# Patient Record
Sex: FEMALE | Race: WHITE | NOT HISPANIC OR LATINO
[De-identification: names, ages, dates, MRNs, and addresses within clinical notes are randomized per-mention and may not be internally consistent; named-entity substitution may affect disease eponyms.]

---

## 2018-04-24 PROBLEM — Z00.00 ENCOUNTER FOR PREVENTIVE HEALTH EXAMINATION: Status: ACTIVE | Noted: 2018-04-24

## 2018-05-15 ENCOUNTER — APPOINTMENT (OUTPATIENT)
Dept: NUCLEAR MEDICINE | Facility: CLINIC | Age: 58
End: 2018-05-15
Payer: MEDICARE

## 2018-05-15 ENCOUNTER — OUTPATIENT (OUTPATIENT)
Dept: OUTPATIENT SERVICES | Facility: HOSPITAL | Age: 58
LOS: 1 days | End: 2018-05-15

## 2018-05-15 DIAGNOSIS — Z85.118 PERSONAL HISTORY OF OTHER MALIGNANT NEOPLASM OF BRONCHUS AND LUNG: ICD-10-CM

## 2018-05-16 PROCEDURE — 78815 PET IMAGE W/CT SKULL-THIGH: CPT | Mod: 26,PS

## 2019-02-22 ENCOUNTER — OUTPATIENT (OUTPATIENT)
Dept: OUTPATIENT SERVICES | Facility: HOSPITAL | Age: 59
LOS: 1 days | End: 2019-02-22

## 2019-11-29 ENCOUNTER — APPOINTMENT (OUTPATIENT)
Dept: CT IMAGING | Facility: CLINIC | Age: 59
End: 2019-11-29
Payer: MEDICARE

## 2019-11-29 PROCEDURE — 70450 CT HEAD/BRAIN W/O DYE: CPT

## 2020-01-07 ENCOUNTER — APPOINTMENT (OUTPATIENT)
Dept: RADIOLOGY | Facility: CLINIC | Age: 60
End: 2020-01-07
Payer: MEDICARE

## 2020-01-07 PROCEDURE — 77080 DXA BONE DENSITY AXIAL: CPT

## 2020-02-18 ENCOUNTER — RESULT REVIEW (OUTPATIENT)
Age: 60
End: 2020-02-18

## 2020-06-01 ENCOUNTER — APPOINTMENT (OUTPATIENT)
Dept: MAMMOGRAPHY | Facility: CLINIC | Age: 60
End: 2020-06-01

## 2021-04-29 ENCOUNTER — APPOINTMENT (OUTPATIENT)
Dept: RADIOLOGY | Facility: CLINIC | Age: 61
End: 2021-04-29
Payer: MEDICARE

## 2021-04-29 PROCEDURE — 72050 X-RAY EXAM NECK SPINE 4/5VWS: CPT

## 2021-04-29 PROCEDURE — 71046 X-RAY EXAM CHEST 2 VIEWS: CPT

## 2021-09-02 DIAGNOSIS — Z01.818 ENCOUNTER FOR OTHER PREPROCEDURAL EXAMINATION: ICD-10-CM

## 2021-09-04 ENCOUNTER — APPOINTMENT (OUTPATIENT)
Dept: DISASTER EMERGENCY | Facility: CLINIC | Age: 61
End: 2021-09-04

## 2021-09-05 LAB — SARS-COV-2 N GENE NPH QL NAA+PROBE: NOT DETECTED

## 2021-09-07 ENCOUNTER — OUTPATIENT (OUTPATIENT)
Dept: OUTPATIENT SERVICES | Facility: HOSPITAL | Age: 61
LOS: 1 days | End: 2021-09-07

## 2021-09-07 ENCOUNTER — INPATIENT (INPATIENT)
Facility: HOSPITAL | Age: 61
LOS: 3 days | Discharge: HOME | End: 2021-09-11
Admitting: RADIOLOGY
Payer: MEDICARE

## 2021-09-07 PROCEDURE — 71045 X-RAY EXAM CHEST 1 VIEW: CPT | Mod: 26

## 2021-09-07 PROCEDURE — 93010 ELECTROCARDIOGRAM REPORT: CPT

## 2021-09-07 PROCEDURE — 32400 NEEDLE BIOPSY CHEST LINING: CPT | Mod: LT

## 2021-09-07 PROCEDURE — 77012 CT SCAN FOR NEEDLE BIOPSY: CPT | Mod: 26,LT

## 2021-09-07 PROCEDURE — 75989 ABSCESS DRAINAGE UNDER X-RAY: CPT | Mod: 26

## 2021-09-08 ENCOUNTER — OUTPATIENT (OUTPATIENT)
Dept: OUTPATIENT SERVICES | Facility: HOSPITAL | Age: 61
LOS: 1 days | End: 2021-09-08

## 2021-09-08 PROCEDURE — 71045 X-RAY EXAM CHEST 1 VIEW: CPT | Mod: 26

## 2021-09-09 ENCOUNTER — OUTPATIENT (OUTPATIENT)
Dept: OUTPATIENT SERVICES | Facility: HOSPITAL | Age: 61
LOS: 1 days | End: 2021-09-09

## 2021-09-09 PROCEDURE — 71045 X-RAY EXAM CHEST 1 VIEW: CPT | Mod: 26

## 2021-09-10 ENCOUNTER — OUTPATIENT (OUTPATIENT)
Dept: OUTPATIENT SERVICES | Facility: HOSPITAL | Age: 61
LOS: 1 days | End: 2021-09-10

## 2021-09-10 PROCEDURE — 71045 X-RAY EXAM CHEST 1 VIEW: CPT | Mod: 26

## 2021-09-11 ENCOUNTER — OUTPATIENT (OUTPATIENT)
Dept: OUTPATIENT SERVICES | Facility: HOSPITAL | Age: 61
LOS: 1 days | End: 2021-09-11

## 2021-09-11 PROCEDURE — 71045 X-RAY EXAM CHEST 1 VIEW: CPT | Mod: 26

## 2021-09-11 PROCEDURE — 71045 X-RAY EXAM CHEST 1 VIEW: CPT | Mod: 26,77

## 2021-09-20 ENCOUNTER — APPOINTMENT (OUTPATIENT)
Dept: THORACIC SURGERY | Facility: CLINIC | Age: 61
End: 2021-09-20
Payer: MEDICARE

## 2021-09-20 DIAGNOSIS — Z87.2 PERSONAL HISTORY OF DISEASES OF THE SKIN AND SUBCUTANEOUS TISSUE: ICD-10-CM

## 2021-09-20 DIAGNOSIS — Z87.39 PERSONAL HISTORY OF OTHER DISEASES OF THE MUSCULOSKELETAL SYSTEM AND CONNECTIVE TISSUE: ICD-10-CM

## 2021-09-20 DIAGNOSIS — Z86.19 PERSONAL HISTORY OF OTHER INFECTIOUS AND PARASITIC DISEASES: ICD-10-CM

## 2021-09-20 DIAGNOSIS — E03.9 HYPOTHYROIDISM, UNSPECIFIED: ICD-10-CM

## 2021-09-20 DIAGNOSIS — J98.19 OTHER PULMONARY COLLAPSE: ICD-10-CM

## 2021-09-20 DIAGNOSIS — Z87.09 PERSONAL HISTORY OF OTHER DISEASES OF THE RESPIRATORY SYSTEM: ICD-10-CM

## 2021-09-20 PROCEDURE — 99214 OFFICE O/P EST MOD 30 MIN: CPT

## 2021-09-20 RX ORDER — LEFLUNOMIDE 20 MG/1
20 TABLET, FILM COATED ORAL
Refills: 0 | Status: ACTIVE | COMMUNITY

## 2021-09-20 RX ORDER — DENOSUMAB 60 MG/ML
INJECTION SUBCUTANEOUS
Refills: 0 | Status: ACTIVE | COMMUNITY

## 2021-09-20 RX ORDER — ALBUTEROL SULFATE 90 UG/1
108 (90 BASE) AEROSOL, METERED RESPIRATORY (INHALATION)
Refills: 0 | Status: ACTIVE | COMMUNITY

## 2021-09-20 RX ORDER — PREDNISONE 2.5 MG/1
2.5 TABLET ORAL
Refills: 0 | Status: ACTIVE | COMMUNITY

## 2021-09-20 RX ORDER — BUPROPION HCL 150 MG
150 TABLET,SUSTAINED-RELEASE 12 HR ORAL
Refills: 0 | Status: ACTIVE | COMMUNITY

## 2021-09-20 RX ORDER — PREGABALIN 100 MG/1
100 CAPSULE ORAL
Refills: 0 | Status: ACTIVE | COMMUNITY

## 2021-09-20 RX ORDER — TRAMADOL HYDROCHLORIDE 50 MG/1
50 TABLET, COATED ORAL
Refills: 0 | Status: ACTIVE | COMMUNITY

## 2021-09-20 RX ORDER — DULOXETINE HYDROCHLORIDE 60 MG/1
60 CAPSULE, DELAYED RELEASE ORAL
Refills: 0 | Status: ACTIVE | COMMUNITY

## 2021-09-20 RX ORDER — LEVOTHYROXINE SODIUM 0.12 MG/1
125 TABLET ORAL
Refills: 0 | Status: ACTIVE | COMMUNITY

## 2021-09-20 RX ORDER — FLUTICASONE FUROATE, UMECLIDINIUM BROMIDE AND VILANTEROL TRIFENATATE 200; 62.5; 25 UG/1; UG/1; UG/1
POWDER RESPIRATORY (INHALATION)
Refills: 0 | Status: ACTIVE | COMMUNITY

## 2021-09-20 RX ORDER — PREDNISONE 5 MG/1
5 TABLET ORAL
Refills: 0 | Status: ACTIVE | COMMUNITY

## 2021-09-20 RX ORDER — CALCIUM CARBONATE 500 MG/1
TABLET, CHEWABLE ORAL
Refills: 0 | Status: ACTIVE | COMMUNITY

## 2021-09-21 NOTE — CONSULT LETTER
[Dear  ___] : Dear  [unfilled], [Courtesy Letter:] : I had the pleasure of seeing your patient, [unfilled], in my office today. [Please see my note below.] : Please see my note below. [Consult Closing:] : Thank you very much for allowing me to participate in the care of this patient.  If you have any questions, please do not hesitate to contact me. [Sincerely,] : Sincerely, [FreeTextEntry3] : Zachary Blackman MD\par Department of Cardiovascular and Thoracic Surgery\par \par [employer]\par [employer] at [facility]

## 2021-09-21 NOTE — HISTORY OF PRESENT ILLNESS
[FreeTextEntry1] : Ms. FIERRO is a 61 year old female who presented to [facility] ([facility]) 9.7.21 for shortness of breath   secondary to a pneumothorax status post IR biopsy for a newly  discovered lung nodule.Other associated symptoms at the time included chest pain. She was immediately treated with a chest tube at [facility]  with improvement in her symptoms. Her past medical history includes recurrent  pneumothorax  ( s/p  biopsy in 2018 and  s/p biopsy  on  9.7.21), COPD, Chanda-Barr virus infection, fibromyalgia,  osteoporosis,  psoriatic arthritis,  rheumatoid arthritis, lung adenocarcinoma diagnosed  in 2007(s/p wedge resection and immunotherapy ), and primary hypothyroidism. \par \par Today she denies  dizziness, shortness of breath with climbing up/down stairs or with exertion, unintentional weight loss, cough, fever or chills. She is here to  discuss her  progress s/p pneumothorax. \par

## 2021-09-21 NOTE — ASSESSMENT
[FreeTextEntry1] : Ness is a 61-year-old female, who was recently hospitalized and underwent a transthoracic needle biopsy of the left lung. This did not demonstrate malignancy. She is scheduled to follow up with her oncologist later this week. I can see me on a p.r.n. basis going forward.\par \par Thank you for allowing me to participate in the care of your patient.\par \par 30 minutes was spent during this encounter.\par \par Zachary Blackman MD\par Department of Cardiovascular and Thoracic Surgery\par \par [employer]\[facility] [employer] at [facility]\par

## 2021-12-13 ENCOUNTER — APPOINTMENT (OUTPATIENT)
Dept: PULMONOLOGY | Facility: CLINIC | Age: 61
End: 2021-12-13
Payer: MEDICARE

## 2021-12-13 VITALS
TEMPERATURE: 97.6 F | OXYGEN SATURATION: 95 % | HEIGHT: 65 IN | HEART RATE: 84 BPM | WEIGHT: 132 LBS | BODY MASS INDEX: 21.99 KG/M2 | SYSTOLIC BLOOD PRESSURE: 151 MMHG | DIASTOLIC BLOOD PRESSURE: 86 MMHG

## 2021-12-13 PROCEDURE — 99204 OFFICE O/P NEW MOD 45 MIN: CPT

## 2021-12-13 RX ORDER — PREDNISONE 10 MG/1
10 TABLET ORAL DAILY
Qty: 60 | Refills: 1 | Status: ACTIVE | COMMUNITY
Start: 2021-12-13 | End: 1900-01-01

## 2021-12-13 RX ORDER — ONDANSETRON HYDROCHLORIDE 8 MG/1
8 TABLET, FILM COATED ORAL
Refills: 0 | Status: ACTIVE | COMMUNITY

## 2021-12-13 RX ORDER — BUPROPION HYDROCHLORIDE 150 MG/1
150 TABLET, EXTENDED RELEASE ORAL
Qty: 90 | Refills: 0 | Status: ACTIVE | COMMUNITY
Start: 2021-07-22

## 2021-12-13 RX ORDER — CLINDAMYCIN PHOSPHATE 10 MG/ML
1 SOLUTION TOPICAL
Qty: 60 | Refills: 0 | Status: ACTIVE | COMMUNITY
Start: 2021-08-09

## 2021-12-13 RX ORDER — CYCLOBENZAPRINE HYDROCHLORIDE 5 MG/1
5 TABLET, FILM COATED ORAL
Qty: 60 | Refills: 0 | Status: ACTIVE | COMMUNITY
Start: 2021-10-04

## 2021-12-13 RX ORDER — ALPRAZOLAM 0.5 MG/1
0.5 TABLET ORAL
Qty: 2 | Refills: 0 | Status: ACTIVE | COMMUNITY
Start: 2021-08-31

## 2021-12-13 RX ORDER — SOD PHOS DI, MONO/K PHOS MONO 250 MG
155-852-130 TABLET ORAL
Qty: 5 | Refills: 0 | Status: ACTIVE | COMMUNITY
Start: 2021-07-29

## 2021-12-13 RX ORDER — APREMILAST 30 MG/1
30 TABLET, FILM COATED ORAL
Refills: 0 | Status: ACTIVE | COMMUNITY

## 2021-12-13 RX ORDER — PREGABALIN 50 MG/1
50 CAPSULE ORAL
Qty: 120 | Refills: 0 | Status: ACTIVE | COMMUNITY
Start: 2021-07-23

## 2022-01-11 ENCOUNTER — APPOINTMENT (OUTPATIENT)
Dept: NEUROSURGERY | Facility: CLINIC | Age: 62
End: 2022-01-11
Payer: MEDICARE

## 2022-01-11 VITALS
TEMPERATURE: 98.6 F | BODY MASS INDEX: 21.99 KG/M2 | WEIGHT: 132 LBS | HEIGHT: 65 IN | HEART RATE: 79 BPM | DIASTOLIC BLOOD PRESSURE: 98 MMHG | SYSTOLIC BLOOD PRESSURE: 166 MMHG

## 2022-01-11 PROCEDURE — 99203 OFFICE O/P NEW LOW 30 MIN: CPT

## 2022-01-11 NOTE — DATA REVIEWED
[de-identified] : Were reviewed of the cervical spine - 1/11/2022: No acute fracture/dislocation; loss of cervical lordosis; degenerative disc disease; cervical spondylosis

## 2022-01-11 NOTE — ASSESSMENT
[FreeTextEntry1] : Discussed the history, physical examination findings, and previous cervical radiographs with the patient with all questions answered.  After review of the imaging and discussion of clinical findings, discussed that an MRI of the cervical spine is warranted for further evaluation.  The patient will have the MRI performed at [facility] and follow-up after the imaging studies are complete.

## 2022-01-11 NOTE — HISTORY OF PRESENT ILLNESS
[FreeTextEntry1] : Neck pain/cervical radiculopathy [de-identified] : 61-year-old female presents to the neurosurgery office for an initial office visit with her  for evaluation of neck pain and upper extremity radicular symptoms.  The patient states that her symptoms have been present for over 3 months with no reported mechanism of injury or recent trauma.  She complains of intermittent weakness, numbness, and tingling of the right and left upper extremities as well as persistent neck pain.  The patient does take tramadol and a muscle relaxant which she gets from her family practice doctor.  The patient reports that it is affecting her ability to perform daily activities.  In addition, the patient has noticed some changes in her gait and some recent clumsiness in her posture.  In addition the patient states that she has noted changes in her handwriting and shaking of her hands (resting tremors).

## 2022-01-11 NOTE — REASON FOR VISIT
[New Patient Visit] : a new patient visit [Spouse] : spouse [FreeTextEntry1] : neck pain and numbness in arms. COVID vaccinated Pfizer second dose in November

## 2022-01-17 NOTE — REVIEW OF SYSTEMS
[Cough] : cough [Chest Tightness] : chest tightness [Anemia] : anemia [Headache] : headache [Tremor] : tremor [Depression] : depression [Anxiety] : anxiety [Thyroid Problem] : thyroid problem [Negative] : Dermatologic [Sputum] : no sputum [Seasonal Allergies] : no seasonal allergies [GERD] : no gerd [Diarrhea] : no diarrhea [Constipation] : no constipation [Dysphagia] : no dysphagia [Dizziness] : no dizziness [Memory Loss] : no memory loss [Panic Attacks] : no panic attacks [Diabetes] : no diabetes [Obesity] : no obesity [TextBox_3] : night sweats  weight is stable  [TextBox_14] : poor hearing   she has dentures  [TextBox_30] : occasional. has chronic chest tightness  had PNa in 2018 with hemoptysis  [TextBox_44] : 2 pillows [TextBox_94] : rheumatoid arthritis 2018   [TextBox_104] : blotches  psoriatic  [TextBox_113] : iron infusions from her cancer rx [TextBox_122] : slight tremor  [TextBox_144] : s/p thyroiditis and now on replacement

## 2022-01-17 NOTE — HISTORY OF PRESENT ILLNESS
[Former] : former [Never] : never [TextBox_4] : 12/13/21  The patient has a hx of smoking, COPD and LUNg cancer. She has had a lung Bx on the left side in 9/2021 and it was negative The original tumor was on the right side and she had a resection on the right and then a recurrence on the suture line with localized radiation The patient has HOme O2 but has not used i recently  She is on Trelegy as her maintenance   and has rescue inhalers   She takes prednisone for her RA 7.5mg a day   She stop[ped smoking in 2007    [TextBox_11] : 1 [TextBox_13] : 30 [YearQuit] : 2007

## 2022-01-17 NOTE — REASON FOR VISIT
[Follow-Up] : a follow-up visit [Lung Cancer] : lung cancer [Cough] : cough [Shortness of Breath] : shortness of breath [Wheezing] : wheezing [Spouse] : spouse [TextBox_44] : assessment. Pt has had lung cancer 4 times, she has had her entire right lobe removed. Pt is here today with complaints of SOB with minimal activity, wheezing and a non productive cough. Pt states she gets PET scan every 3 months and is due this week.

## 2022-01-17 NOTE — PHYSICAL EXAM
[No Acute Distress] : no acute distress [Normal Rate/Rhythm] : normal rate/rhythm [Normal S1, S2] : normal s1, s2 [No Resp Distress] : no resp distress [Clear to Auscultation Bilaterally] : clear to auscultation bilaterally [Normal Gait] : normal gait [No Clubbing] : no clubbing [No Cyanosis] : no cyanosis [No Edema] : no edema [FROM] : FROM [TextBox_68] : decreased s and wheezing

## 2022-01-17 NOTE — ASSESSMENT
[FreeTextEntry1] : 12/13/21  The patient has COPD and has an exacerbation  she is on maintenance inhlaers and low dose prednisone( 7.5mg) for her RA   At this time I will rx her with an increase in her prednisone to 20mg a day until she returns to her baseline. she will obtain a PFt after her exacerbation resolves.    f/u 1 month and call if there is a further exacerbation.   time spent 45 min imaging counselling Rx PE and Hx

## 2022-01-19 ENCOUNTER — APPOINTMENT (OUTPATIENT)
Dept: PULMONOLOGY | Facility: CLINIC | Age: 62
End: 2022-01-19

## 2022-02-09 ENCOUNTER — APPOINTMENT (OUTPATIENT)
Dept: PULMONOLOGY | Facility: CLINIC | Age: 62
End: 2022-02-09
Payer: MEDICARE

## 2022-02-09 DIAGNOSIS — C34.90 MALIGNANT NEOPLASM OF UNSPECIFIED PART OF UNSPECIFIED BRONCHUS OR LUNG: ICD-10-CM

## 2022-02-09 DIAGNOSIS — R06.02 SHORTNESS OF BREATH: ICD-10-CM

## 2022-02-09 DIAGNOSIS — J44.1 CHRONIC OBSTRUCTIVE PULMONARY DISEASE WITH (ACUTE) EXACERBATION: ICD-10-CM

## 2022-02-09 PROCEDURE — 99441: CPT

## 2022-03-04 ENCOUNTER — APPOINTMENT (OUTPATIENT)
Dept: NEUROSURGERY | Facility: CLINIC | Age: 62
End: 2022-03-04
Payer: MEDICARE

## 2022-03-04 VITALS
TEMPERATURE: 98.4 F | BODY MASS INDEX: 21.99 KG/M2 | HEIGHT: 65 IN | DIASTOLIC BLOOD PRESSURE: 80 MMHG | SYSTOLIC BLOOD PRESSURE: 140 MMHG | HEART RATE: 75 BPM | WEIGHT: 132 LBS

## 2022-03-04 DIAGNOSIS — M54.12 RADICULOPATHY, CERVICAL REGION: ICD-10-CM

## 2022-03-04 DIAGNOSIS — M48.02 SPINAL STENOSIS, CERVICAL REGION: ICD-10-CM

## 2022-03-04 DIAGNOSIS — M62.838 OTHER MUSCLE SPASM: ICD-10-CM

## 2022-03-04 DIAGNOSIS — M47.812 SPONDYLOSIS W/OUT MYELOPATHY OR RADICULOPATHY, CERVICAL REGION: ICD-10-CM

## 2022-03-04 DIAGNOSIS — M54.2 CERVICALGIA: ICD-10-CM

## 2022-03-04 DIAGNOSIS — M50.90 CERVICAL DISC DISORDER, UNSPECIFIED, UNSPECIFIED CERVICAL REGION: ICD-10-CM

## 2022-03-04 PROCEDURE — 99213 OFFICE O/P EST LOW 20 MIN: CPT

## 2022-03-04 NOTE — HISTORY OF PRESENT ILLNESS
[FreeTextEntry1] : Neck pain/cervical radiculopathy [de-identified] : 61-year-old female presents to the neurosurgery office for follow-up evaluation with her  for evaluation of neck pain and upper extremity radicular symptoms.  The patient states that her symptoms have been present for over 3 months with no reported mechanism of injury or recent trauma.  She complains of intermittent weakness, numbness, and tingling of the right and left upper extremities as well as persistent neck pain.  The patient does take tramadol and a muscle relaxant which she gets from her family practice doctor.  The patient reports that it is affecting her ability to perform daily activities.  In addition, the patient has noticed some changes in her gait and some recent clumsiness in her posture.  In addition the patient states that she has noted changes in her handwriting and shaking of her hands (resting tremors).  At the last office visit, MRI imaging was ordered which the patient is here today to review.

## 2022-03-04 NOTE — ASSESSMENT
[FreeTextEntry1] : Discussed the history, physical examination findings, and previous cervical radiographs with the patient with all questions answered.  Conservative treatment recommended at this time with rest and modified activity, formal physical therapy and pain management with referral provided today.  Muscle relaxants as tolerated.  Discussed that the patient cannot take anti-inflammatory medications because of her history of COPD which she takes steroids on a chronic basis.  If symptoms have not improved with the above recommendations, she may follow-up for further evaluation and discussion with the neurosurgerty attending.

## 2022-03-04 NOTE — DATA REVIEWED
[de-identified] : Was reviewed of the cervical spine -ZPRAD -reveals multilevel cervical spondylosis;   Cervical stenosis; reversal of cervical lordosis with spondylolisthesis [de-identified] : Were reviewed of the cervical spine - 1/11/2022: No acute fracture/dislocation; loss of cervical lordosis; degenerative disc disease; cervical spondylosis

## 2022-03-04 NOTE — PHYSICAL EXAM
[General Appearance - Alert] : alert [General Appearance - In No Acute Distress] : in no acute distress [General Appearance - Well Nourished] : well nourished [General Appearance - Well Developed] : well developed [General Appearance - Well-Appearing] : healthy appearing [] : normal voice and communication [Oriented To Time, Place, And Person] : oriented to person, place, and time [Impaired Insight] : insight and judgment were intact [Affect] : the affect was normal [Mood] : the mood was normal [Memory Recent] : recent memory was not impaired [Memory Remote] : remote memory was not impaired [Person] : oriented to person [Place] : oriented to place [Time] : oriented to time [Motor Handedness Right-Handed] : the patient is right hand dominant

## 2022-10-17 ENCOUNTER — APPOINTMENT (OUTPATIENT)
Dept: THORACIC SURGERY | Facility: CLINIC | Age: 62
End: 2022-10-17

## 2022-10-17 VITALS
DIASTOLIC BLOOD PRESSURE: 81 MMHG | BODY MASS INDEX: 21.66 KG/M2 | HEART RATE: 80 BPM | RESPIRATION RATE: 18 BRPM | HEIGHT: 65 IN | SYSTOLIC BLOOD PRESSURE: 125 MMHG | WEIGHT: 130 LBS | OXYGEN SATURATION: 91 % | TEMPERATURE: 98.1 F

## 2022-10-17 DIAGNOSIS — Z87.891 PERSONAL HISTORY OF NICOTINE DEPENDENCE: ICD-10-CM

## 2022-10-17 DIAGNOSIS — Z80.1 FAMILY HISTORY OF MALIGNANT NEOPLASM OF TRACHEA, BRONCHUS AND LUNG: ICD-10-CM

## 2022-10-17 DIAGNOSIS — Z80.0 FAMILY HISTORY OF MALIGNANT NEOPLASM OF DIGESTIVE ORGANS: ICD-10-CM

## 2022-10-17 PROCEDURE — 99214 OFFICE O/P EST MOD 30 MIN: CPT

## 2022-10-17 NOTE — HISTORY OF PRESENT ILLNESS
[FreeTextEntry1] : Ms. CODEY FIERRO is a 62 year female who presents today for followup. She is monitored for history of lung cancer by Dr. Fuller. Previously, she had been seen at [facility] at the beginning of 2020 for a lung biopsy complicated by pneumothorax which showed non small cell lung cancer for which she had radiation and chemotherapy. Again last month in 2022, she had another biopsy, with the same result of pneumothorax.\par \par Additional past medical history includes fibromyalgia, rheumatoid arthritis, lung cancer 2007 right lung status post wedge resection, anemia, hypothyroid, lyme disease\par \par Today, the patient reports labored breathing more than usual when active. Minor cough and chest pain has improved from prior costochondritis. \par \par \par \par \par Referring: Jonny

## 2022-10-17 NOTE — REVIEW OF SYSTEMS
[Chest Pain] : chest pain [Shortness Of Breath] : shortness of breath [Cough] : cough [SOB on Exertion] : shortness of breath during exertion [Negative] : Heme/Lymph [Palpitations] : no palpitations

## 2022-10-17 NOTE — DATA REVIEWED
[FreeTextEntry1] : PET scan\par 9/22/22\par \par Impression:\par Increasing opacity in the left upper lobe laterally with increased activity may represent worsening postradiation changes with atelectasis or worsening neoplastic lesion. No suspicious FDG avid lesions

## 2022-10-17 NOTE — ASSESSMENT
[FreeTextEntry1] : Ness is a 62-year-old female with severe COPD and multiple lung cancer recurrences in the past who presents now with an enlarging abnormality in the left upper lobe.  This has not yet been biopsied but she has had several biopsies in the past resulting in pneumothorax and she is quite fearful of a transthoracic approach.  I do believe based on the extent of her COPD she is a very high risk for surgical biopsy that is likely prohibitive.  I do believe focal radiation without a diagnosis is a very reasonable approach and per the patient that has been discussed as an option.\par \par Thank you for allowing me to participate in the care of your patient.\par \par 30 minutes was spent during this encounter.\par \par Zachary Blackman MD\par Department of Cardiovascular and Thoracic Surgery\par \[employer]\[facility] [employer] at [facility]\par

## 2023-05-17 ENCOUNTER — APPOINTMENT (OUTPATIENT)
Dept: MAMMOGRAPHY | Facility: CLINIC | Age: 63
End: 2023-05-17

## 2023-05-17 ENCOUNTER — APPOINTMENT (OUTPATIENT)
Dept: RADIOLOGY | Facility: CLINIC | Age: 63
End: 2023-05-17
Payer: SELF-PAY

## 2023-05-17 ENCOUNTER — APPOINTMENT (OUTPATIENT)
Dept: ULTRASOUND IMAGING | Facility: CLINIC | Age: 63
End: 2023-05-17
Payer: SELF-PAY

## 2023-05-17 PROCEDURE — 76536 US EXAM OF HEAD AND NECK: CPT

## 2023-05-17 PROCEDURE — 77080 DXA BONE DENSITY AXIAL: CPT

## 2023-07-28 NOTE — PHYSICAL EXAM
[General Appearance - Alert] : alert [General Appearance - In No Acute Distress] : in no acute distress [General Appearance - Well Nourished] : well nourished [General Appearance - Well Developed] : well developed [General Appearance - Well-Appearing] : healthy appearing [] : normal voice and communication [Oriented To Time, Place, And Person] : oriented to person, place, and time [Impaired Insight] : insight and judgment were intact [Affect] : the affect was normal [Mood] : the mood was normal [Memory Recent] : recent memory was not impaired [Memory Remote] : remote memory was not impaired [Person] : oriented to person [Place] : oriented to place [Time] : oriented to time [Motor Handedness Right-Handed] : the patient is right hand dominant [No Acute Distress] : no acute distress [Well Nourished] : well nourished [Well Developed] : well developed [Well-Appearing] : well-appearing [Normal Sclera/Conjunctiva] : normal sclera/conjunctiva [PERRL] : pupils equal round and reactive to light [EOMI] : extraocular movements intact [Normal Outer Ear/Nose] : the outer ears and nose were normal in appearance [Normal Oropharynx] : the oropharynx was normal [No Lymphadenopathy] : no lymphadenopathy [Supple] : supple [Thyroid Normal, No Nodules] : the thyroid was normal and there were no nodules present [No Respiratory Distress] : no respiratory distress  [No Accessory Muscle Use] : no accessory muscle use [Clear to Auscultation] : lungs were clear to auscultation bilaterally [Normal Rate] : normal rate  [Regular Rhythm] : with a regular rhythm [Normal S1, S2] : normal S1 and S2 [No Murmur] : no murmur heard [No Abdominal Bruit] : a ~M bruit was not heard ~T in the abdomen [No Varicosities] : no varicosities [Pedal Pulses Present] : the pedal pulses are present [No Edema] : there was no peripheral edema [No Palpable Aorta] : no palpable aorta [Soft] : abdomen soft [No Extremity Clubbing/Cyanosis] : no extremity clubbing/cyanosis [Non Tender] : non-tender [Non-distended] : non-distended [No Masses] : no abdominal mass palpated [No HSM] : no HSM [Normal Bowel Sounds] : normal bowel sounds [Normal Posterior Cervical Nodes] : no posterior cervical lymphadenopathy [Normal Anterior Cervical Nodes] : no anterior cervical lymphadenopathy [No Joint Swelling] : no joint swelling [Grossly Normal Strength/Tone] : grossly normal strength/tone [No Rash] : no rash [Coordination Grossly Intact] : coordination grossly intact [No Focal Deficits] : no focal deficits [Normal Gait] : normal gait [Normal Affect] : the affect was normal [Normal Insight/Judgement] : insight and judgment were intact

## 2024-03-20 ENCOUNTER — NON-APPOINTMENT (OUTPATIENT)
Age: 64
End: 2024-03-20

## 2024-04-05 ENCOUNTER — APPOINTMENT (OUTPATIENT)
Dept: PULMONOLOGY | Facility: CLINIC | Age: 64
End: 2024-04-05

## 2024-11-08 ENCOUNTER — APPOINTMENT (OUTPATIENT)
Dept: RADIOLOGY | Facility: CLINIC | Age: 64
End: 2024-11-08
Payer: SELF-PAY

## 2024-11-08 PROCEDURE — 73522 X-RAY EXAM HIPS BI 3-4 VIEWS: CPT | Mod: 26

## 2024-11-08 PROCEDURE — 72190 X-RAY EXAM OF PELVIS: CPT | Mod: 26

## 2024-11-11 ENCOUNTER — TRANSCRIPTION ENCOUNTER (OUTPATIENT)
Age: 64
End: 2024-11-11